# Patient Record
Sex: MALE | Race: OTHER | HISPANIC OR LATINO | URBAN - METROPOLITAN AREA
[De-identification: names, ages, dates, MRNs, and addresses within clinical notes are randomized per-mention and may not be internally consistent; named-entity substitution may affect disease eponyms.]

---

## 2017-09-16 ENCOUNTER — INPATIENT (INPATIENT)
Facility: HOSPITAL | Age: 27
LOS: 1 days | Discharge: HOME | DRG: 87 | End: 2017-09-18
Attending: SURGERY | Admitting: SURGERY
Payer: SELF-PAY

## 2017-09-16 VITALS
RESPIRATION RATE: 20 BRPM | HEIGHT: 60 IN | SYSTOLIC BLOOD PRESSURE: 129 MMHG | HEART RATE: 101 BPM | WEIGHT: 169.98 LBS | TEMPERATURE: 99 F | OXYGEN SATURATION: 95 % | DIASTOLIC BLOOD PRESSURE: 71 MMHG
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
## 2017-09-16 DIAGNOSIS — S02.91XA UNSPECIFIED FRACTURE OF SKULL, INITIAL ENCOUNTER FOR CLOSED FRACTURE: ICD-10-CM

## 2017-09-16 DIAGNOSIS — I60.9 NONTRAUMATIC SUBARACHNOID HEMORRHAGE, UNSPECIFIED: ICD-10-CM

## 2017-09-16 DIAGNOSIS — I62.00 NONTRAUMATIC SUBDURAL HEMORRHAGE, UNSPECIFIED: ICD-10-CM

## 2017-09-16 DIAGNOSIS — S06.6X1A TRAUMATIC SUBARACHNOID HEMORRHAGE WITH LOSS OF CONSCIOUSNESS OF 30 MINUTES OR LESS, INITIAL ENCOUNTER: ICD-10-CM

## 2017-09-16 LAB
ABO RH CONFIRMATION: SIGNIFICANT CHANGE UP
ALBUMIN SERPL ELPH-MCNC: 5 G/DL — SIGNIFICANT CHANGE UP (ref 3.3–5.2)
ALP SERPL-CCNC: 99 U/L — SIGNIFICANT CHANGE UP (ref 40–120)
ALT FLD-CCNC: 47 U/L — HIGH
ANION GAP SERPL CALC-SCNC: 16 MMOL/L — SIGNIFICANT CHANGE UP (ref 5–17)
APTT BLD: 28 SEC — SIGNIFICANT CHANGE UP (ref 27.5–37.4)
AST SERPL-CCNC: 39 U/L — SIGNIFICANT CHANGE UP
BILIRUB SERPL-MCNC: 0.2 MG/DL — LOW (ref 0.4–2)
BLD GP AB SCN SERPL QL: SIGNIFICANT CHANGE UP
BUN SERPL-MCNC: 12 MG/DL — SIGNIFICANT CHANGE UP (ref 8–20)
CALCIUM SERPL-MCNC: 8.7 MG/DL — SIGNIFICANT CHANGE UP (ref 8.6–10.2)
CHLORIDE SERPL-SCNC: 102 MMOL/L — SIGNIFICANT CHANGE UP (ref 98–107)
CO2 SERPL-SCNC: 21 MMOL/L — LOW (ref 22–29)
CREAT SERPL-MCNC: 0.65 MG/DL — SIGNIFICANT CHANGE UP (ref 0.5–1.3)
ETHANOL SERPL-MCNC: 230 MG/DL — SIGNIFICANT CHANGE UP
GLUCOSE SERPL-MCNC: 108 MG/DL — SIGNIFICANT CHANGE UP (ref 70–115)
HCT VFR BLD CALC: 45.2 % — SIGNIFICANT CHANGE UP (ref 42–52)
HGB BLD-MCNC: 15.2 G/DL — SIGNIFICANT CHANGE UP (ref 14–18)
INR BLD: 1 RATIO — SIGNIFICANT CHANGE UP (ref 0.88–1.16)
MCHC RBC-ENTMCNC: 31.2 PG — HIGH (ref 27–31)
MCHC RBC-ENTMCNC: 33.6 G/DL — SIGNIFICANT CHANGE UP (ref 32–36)
MCV RBC AUTO: 92.8 FL — SIGNIFICANT CHANGE UP (ref 80–94)
PLATELET # BLD AUTO: 353 K/UL — SIGNIFICANT CHANGE UP (ref 150–400)
POTASSIUM SERPL-MCNC: 3.7 MMOL/L — SIGNIFICANT CHANGE UP (ref 3.5–5.3)
POTASSIUM SERPL-SCNC: 3.7 MMOL/L — SIGNIFICANT CHANGE UP (ref 3.5–5.3)
PROT SERPL-MCNC: 7.8 G/DL — SIGNIFICANT CHANGE UP (ref 6.6–8.7)
PROTHROM AB SERPL-ACNC: 11 SEC — SIGNIFICANT CHANGE UP (ref 9.8–12.7)
RBC # BLD: 4.87 M/UL — SIGNIFICANT CHANGE UP (ref 4.6–6.2)
RBC # FLD: 12.3 % — SIGNIFICANT CHANGE UP (ref 11–15.6)
SODIUM SERPL-SCNC: 139 MMOL/L — SIGNIFICANT CHANGE UP (ref 135–145)
TYPE + AB SCN PNL BLD: SIGNIFICANT CHANGE UP
WBC # BLD: 13.6 K/UL — HIGH (ref 4.8–10.8)
WBC # FLD AUTO: 13.6 K/UL — HIGH (ref 4.8–10.8)

## 2017-09-16 PROCEDURE — 72125 CT NECK SPINE W/O DYE: CPT | Mod: 26

## 2017-09-16 PROCEDURE — 71010: CPT | Mod: 26

## 2017-09-16 PROCEDURE — 99222 1ST HOSP IP/OBS MODERATE 55: CPT

## 2017-09-16 PROCEDURE — 70450 CT HEAD/BRAIN W/O DYE: CPT | Mod: 26

## 2017-09-16 PROCEDURE — 70450 CT HEAD/BRAIN W/O DYE: CPT | Mod: 26,77

## 2017-09-16 PROCEDURE — 99291 CRITICAL CARE FIRST HOUR: CPT

## 2017-09-16 PROCEDURE — 99053 MED SERV 10PM-8AM 24 HR FAC: CPT

## 2017-09-16 RX ORDER — OXYCODONE AND ACETAMINOPHEN 5; 325 MG/1; MG/1
2 TABLET ORAL EVERY 6 HOURS
Qty: 0 | Refills: 0 | Status: DISCONTINUED | OUTPATIENT
Start: 2017-09-16 | End: 2017-09-18

## 2017-09-16 RX ORDER — LEVETIRACETAM 250 MG/1
500 TABLET, FILM COATED ORAL EVERY 12 HOURS
Qty: 0 | Refills: 0 | Status: DISCONTINUED | OUTPATIENT
Start: 2017-09-16 | End: 2017-09-18

## 2017-09-16 RX ORDER — ONDANSETRON 8 MG/1
4 TABLET, FILM COATED ORAL ONCE
Qty: 0 | Refills: 0 | Status: COMPLETED | OUTPATIENT
Start: 2017-09-16 | End: 2017-09-16

## 2017-09-16 RX ORDER — SODIUM CHLORIDE 9 MG/ML
1000 INJECTION INTRAMUSCULAR; INTRAVENOUS; SUBCUTANEOUS
Qty: 0 | Refills: 0 | Status: DISCONTINUED | OUTPATIENT
Start: 2017-09-16 | End: 2017-09-17

## 2017-09-16 RX ORDER — ACETAMINOPHEN 500 MG
650 TABLET ORAL EVERY 6 HOURS
Qty: 0 | Refills: 0 | Status: DISCONTINUED | OUTPATIENT
Start: 2017-09-16 | End: 2017-09-18

## 2017-09-16 RX ORDER — SODIUM CHLORIDE 9 MG/ML
1000 INJECTION, SOLUTION INTRAVENOUS
Qty: 0 | Refills: 0 | Status: DISCONTINUED | OUTPATIENT
Start: 2017-09-16 | End: 2017-09-18

## 2017-09-16 RX ORDER — OXYCODONE AND ACETAMINOPHEN 5; 325 MG/1; MG/1
1 TABLET ORAL EVERY 6 HOURS
Qty: 0 | Refills: 0 | Status: DISCONTINUED | OUTPATIENT
Start: 2017-09-16 | End: 2017-09-18

## 2017-09-16 RX ADMIN — SODIUM CHLORIDE 100 MILLILITER(S): 9 INJECTION, SOLUTION INTRAVENOUS at 12:49

## 2017-09-16 RX ADMIN — OXYCODONE AND ACETAMINOPHEN 1 TABLET(S): 5; 325 TABLET ORAL at 18:05

## 2017-09-16 RX ADMIN — OXYCODONE AND ACETAMINOPHEN 1 TABLET(S): 5; 325 TABLET ORAL at 18:20

## 2017-09-16 RX ADMIN — ONDANSETRON 4 MILLIGRAM(S): 8 TABLET, FILM COATED ORAL at 07:05

## 2017-09-16 RX ADMIN — LEVETIRACETAM 420 MILLIGRAM(S): 250 TABLET, FILM COATED ORAL at 08:29

## 2017-09-16 RX ADMIN — SODIUM CHLORIDE 100 MILLILITER(S): 9 INJECTION, SOLUTION INTRAVENOUS at 18:06

## 2017-09-16 RX ADMIN — SODIUM CHLORIDE 100 MILLILITER(S): 9 INJECTION INTRAMUSCULAR; INTRAVENOUS; SUBCUTANEOUS at 08:29

## 2017-09-16 RX ADMIN — LEVETIRACETAM 420 MILLIGRAM(S): 250 TABLET, FILM COATED ORAL at 18:19
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
## 2017-09-16 NOTE — ED PROVIDER NOTE - CARE PLAN
Principal Discharge DX:	Concussion, without LOC, initial encounter  Secondary Diagnosis:	Alcohol abuse Principal Discharge DX:	Subarachnoid hemorrhage following injury, with LOC of 30 min or less, initial encounter  Secondary Diagnosis:	Alcohol abuse
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
## 2017-09-16 NOTE — ED ADULT TRIAGE NOTE - CHIEF COMPLAINT QUOTE
patient arrived via ambulance found sleeping on concrete parking lot in [address], laying on a bagel  - aroused by ems with painful stimuli - old blood noted in patients mouth -  cell phone wallet, pants, belt, shoes, shirt, vest - 40.00  cash, keys - small yellow bag with white powder in it found in pants. pupils dilated - patient clothing removed and placed in yellow gown patient arrived via ambulance found sleeping on concrete parking lot in [address], laying on a bagel  - aroused by ems with painful stimuli - old blood noted in patients mouth -  cell phone wallet, pants, belt, shoes, shirt, vest - 48.00  cash, keys - small yellow bag with white powder in it found in pants. pupils dilated - patient clothing removed and placed in yellow gown. patient with  complains of pain to mouth and jaw area - no other injuries noted or other complaints. patient is unable to state weight and height. awake to self and age

## 2017-09-16 NOTE — ED PROVIDER NOTE - PHYSICAL EXAMINATION
VS: reviewed in triage note...  HEENT: swelling to posterior occiput   CV:s1,s2 no m/r/g  Lungs: cta b/l, no r/r/w  Abd: soft, nt/nd, +bs  EXT: no c/c/e  Neuro:no focal defecits  skin: no rash  Pulses: dpp, pt 2+ b/l

## 2017-09-16 NOTE — ED PROVIDER NOTE - PRINCIPAL DIAGNOSIS
Concussion, without LOC, initial encounter Subarachnoid hemorrhage following injury, with LOC of 30 min or less, initial encounter
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
## 2017-09-16 NOTE — H&P ADULT - PROBLEM SELECTOR PLAN 2
Admit to monitored bed in step down  Neuro checks Q2  Neurosurgery consultation   repeat head CT scan

## 2017-09-16 NOTE — H&P ADULT - NSHPPHYSICALEXAM_GEN_ALL_CORE
Gen: A&Ox3, NAD, Somnolent  HEENT: minimal swelling to the Left temporal region, contusion to the right occipital region, Pupils sluggish reactive 6mm pupils b/l, No cervical neck tenderness  Cardio:RRR  Pulm: clear equal b/l lungs  Abd: soft, ND , NT  Pelvis: stable , Nontender  Back: Nontender, no step off   Extremities: 5/5 motor in all extremities, distal pulses intact, ROM intact, Sensation intact in all extremities
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
## 2017-09-16 NOTE — ED ADULT NURSE REASSESSMENT NOTE - NS ED NURSE REASSESS COMMENT FT1
Assumed patient care from MICHELLE Payan at 1930, charting as noted. Received patient a&ox3, able to make needs known in [place], on a yellow gown with a one to one aide for constant observation at bedside. Patient vss, nsr on cm. Complains of pain at back of head, 2/10, doesn't want pain medication at this time. Right ac iv site without redness or swelling, patent as LR infusing at 100 cc/hr. Plan of care and wait time explained, patient verbalized understanding. Patient not in respiratory distress. To continue to monitor.
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
## 2017-09-16 NOTE — ED ADULT NURSE REASSESSMENT NOTE - NS ED NURSE REASSESS COMMENT FT1
Patient A&0X3. c/o mild headache, trauma team aware. VSS. CM in place. NSR. 1;1 at bedside. safety maintained.
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
## 2017-09-17 LAB
ANION GAP SERPL CALC-SCNC: 11 MMOL/L — SIGNIFICANT CHANGE UP (ref 5–17)
BASOPHILS # BLD AUTO: 0 K/UL — SIGNIFICANT CHANGE UP (ref 0–0.2)
BASOPHILS NFR BLD AUTO: 0.3 % — SIGNIFICANT CHANGE UP (ref 0–2)
BUN SERPL-MCNC: 10 MG/DL — SIGNIFICANT CHANGE UP (ref 8–20)
CALCIUM SERPL-MCNC: 9.1 MG/DL — SIGNIFICANT CHANGE UP (ref 8.6–10.2)
CHLORIDE SERPL-SCNC: 100 MMOL/L — SIGNIFICANT CHANGE UP (ref 98–107)
CO2 SERPL-SCNC: 28 MMOL/L — SIGNIFICANT CHANGE UP (ref 22–29)
CREAT SERPL-MCNC: 0.66 MG/DL — SIGNIFICANT CHANGE UP (ref 0.5–1.3)
EOSINOPHIL # BLD AUTO: 0.3 K/UL — SIGNIFICANT CHANGE UP (ref 0–0.5)
EOSINOPHIL NFR BLD AUTO: 2.7 % — SIGNIFICANT CHANGE UP (ref 0–5)
GLUCOSE SERPL-MCNC: 118 MG/DL — HIGH (ref 70–115)
HCT VFR BLD CALC: 42.3 % — SIGNIFICANT CHANGE UP (ref 42–52)
HGB BLD-MCNC: 14.4 G/DL — SIGNIFICANT CHANGE UP (ref 14–18)
LYMPHOCYTES # BLD AUTO: 17.6 % — LOW (ref 20–55)
LYMPHOCYTES # BLD AUTO: 2 K/UL — SIGNIFICANT CHANGE UP (ref 1–4.8)
MAGNESIUM SERPL-MCNC: 1.9 MG/DL — SIGNIFICANT CHANGE UP (ref 1.6–2.6)
MCHC RBC-ENTMCNC: 31.5 PG — HIGH (ref 27–31)
MCHC RBC-ENTMCNC: 34 G/DL — SIGNIFICANT CHANGE UP (ref 32–36)
MCV RBC AUTO: 92.6 FL — SIGNIFICANT CHANGE UP (ref 80–94)
MONOCYTES # BLD AUTO: 0.8 K/UL — SIGNIFICANT CHANGE UP (ref 0–0.8)
MONOCYTES NFR BLD AUTO: 7.6 % — SIGNIFICANT CHANGE UP (ref 3–10)
NEUTROPHILS # BLD AUTO: 7.9 K/UL — SIGNIFICANT CHANGE UP (ref 1.8–8)
NEUTROPHILS NFR BLD AUTO: 71.4 % — SIGNIFICANT CHANGE UP (ref 37–73)
PHOSPHATE SERPL-MCNC: 3.7 MG/DL — SIGNIFICANT CHANGE UP (ref 2.4–4.7)
PLATELET # BLD AUTO: 246 K/UL — SIGNIFICANT CHANGE UP (ref 150–400)
POTASSIUM SERPL-MCNC: 3.8 MMOL/L — SIGNIFICANT CHANGE UP (ref 3.5–5.3)
POTASSIUM SERPL-SCNC: 3.8 MMOL/L — SIGNIFICANT CHANGE UP (ref 3.5–5.3)
RBC # BLD: 4.57 M/UL — LOW (ref 4.6–6.2)
RBC # FLD: 11.8 % — SIGNIFICANT CHANGE UP (ref 11–15.6)
SODIUM SERPL-SCNC: 139 MMOL/L — SIGNIFICANT CHANGE UP (ref 135–145)
WBC # BLD: 11.1 K/UL — HIGH (ref 4.8–10.8)
WBC # FLD AUTO: 11.1 K/UL — HIGH (ref 4.8–10.8)

## 2017-09-17 RX ADMIN — SODIUM CHLORIDE 100 MILLILITER(S): 9 INJECTION, SOLUTION INTRAVENOUS at 08:36

## 2017-09-17 RX ADMIN — OXYCODONE AND ACETAMINOPHEN 1 TABLET(S): 5; 325 TABLET ORAL at 10:36

## 2017-09-17 RX ADMIN — OXYCODONE AND ACETAMINOPHEN 2 TABLET(S): 5; 325 TABLET ORAL at 20:46

## 2017-09-17 RX ADMIN — OXYCODONE AND ACETAMINOPHEN 2 TABLET(S): 5; 325 TABLET ORAL at 18:37

## 2017-09-17 RX ADMIN — SODIUM CHLORIDE 100 MILLILITER(S): 9 INJECTION, SOLUTION INTRAVENOUS at 18:38

## 2017-09-17 RX ADMIN — LEVETIRACETAM 420 MILLIGRAM(S): 250 TABLET, FILM COATED ORAL at 18:38

## 2017-09-17 RX ADMIN — LEVETIRACETAM 420 MILLIGRAM(S): 250 TABLET, FILM COATED ORAL at 08:36

## 2017-09-17 RX ADMIN — OXYCODONE AND ACETAMINOPHEN 1 TABLET(S): 5; 325 TABLET ORAL at 18:38

## 2017-09-17 NOTE — ED ADULT NURSE REASSESSMENT NOTE - GLASGOW COMA SCALE: BEST VERBAL RESPONSE
(V5) oriented

## 2017-09-17 NOTE — ED ADULT NURSE REASSESSMENT NOTE - CONDITION
improved
improved
unchanged

## 2017-09-17 NOTE — ED ADULT NURSE REASSESSMENT NOTE - VISUAL DISTURBANCES
0=not present

## 2017-09-17 NOTE — ED ADULT NURSE REASSESSMENT NOTE - NURSING NEURO LEVEL OF CONSCIOUSNESS
alert and awake
follows commands/alert and awake
alert and awake
follows commands/alert and awake
alert and awake
alert and awake
alert and awake/follows commands
alert and awake/follows commands

## 2017-09-17 NOTE — ED ADULT NURSE REASSESSMENT NOTE - PAROXYSMAL SWEATS
0=no sweat visible

## 2017-09-17 NOTE — ED ADULT NURSE REASSESSMENT NOTE - NS ED NURSE REASSESS COMMENT FT1
Assumed pt. care from Schuyler MARTINEZ. Pt. is a/o x3 connected to the cardiac monitor. Pt. denies any pain at this time and has relief of HA after pain administration. Pt. is able to move all extremities with ease and able to ambulate with a steady gait. Pt. respirations are even and unlabored. Will continue to monitor pt.

## 2017-09-17 NOTE — ED ADULT NURSE REASSESSMENT NOTE - GENERAL PATIENT STATE
comfortable appearance/cooperative
resting/sleeping
resting/sleeping
comfortable appearance/cooperative
comfortable appearance/cooperative
cooperative/comfortable appearance
cooperative/comfortable appearance
resting/sleeping/comfortable appearance
smiling/interactive/comfortable appearance/cooperative
comfortable appearance/cooperative
cooperative/comfortable appearance
comfortable appearance/cooperative
resting/sleeping/cooperative

## 2017-09-17 NOTE — ED ADULT NURSE REASSESSMENT NOTE - PAIN: RESPONSE TO INTERVENTIONS
absence of nonverbal indicators of pain
relief
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
## 2017-09-17 NOTE — ED ADULT NURSE REASSESSMENT NOTE - NEURO MENTATION
normal

## 2017-09-17 NOTE — ED ADULT NURSE REASSESSMENT NOTE - NS ED NURSE REASSESS COMMENT FT1
Patient A&OX3. c/o headache at this time. pain meds given as per MD ordered. VSS. CM in place. NSR. safety maintained.

## 2017-09-17 NOTE — ED ADULT NURSE REASSESSMENT NOTE - ANXIETY
1=mildly anxious
0=no anxiety, at ease
0=no anxiety, at ease
1=mildly anxious
0=no anxiety, at ease
1=mildly anxious
0=no anxiety, at ease
0=no anxiety, at ease
1=mildly anxious

## 2017-09-17 NOTE — ED ADULT NURSE REASSESSMENT NOTE - NS ED NURSE REASSESS COMMENT FT1
Pt. resting in bed with family at bedside. Pt. eating dinner. Pt. c/o of increase of pain at this time; informed pt. that pain medication is not due until later.

## 2017-09-17 NOTE — ED ADULT NURSE REASSESSMENT NOTE - TREMOR
0=no tremor
1=not visible but can be felt fingertip to fingertip
0=no tremor

## 2017-09-17 NOTE — ED ADULT NURSE REASSESSMENT NOTE - NAUSEA/VOMITING
0=no nausea with no vomiting

## 2017-09-17 NOTE — ED ADULT NURSE REASSESSMENT NOTE - AGITATION
0=normal activity

## 2017-09-17 NOTE — ED ADULT NURSE REASSESSMENT NOTE - FACIAL SYMMETRY
symmetrical

## 2017-09-17 NOTE — ED ADULT NURSE REASSESSMENT NOTE - GLASGOW COMA SCALE: BEST MOTOR RESPONSE
(M6) obeys commands
(M5) localizes pain
(M6) obeys commands

## 2017-09-17 NOTE — ED ADULT NURSE REASSESSMENT NOTE - NEURO SENSATION
sensory intact

## 2017-09-17 NOTE — ED ADULT NURSE REASSESSMENT NOTE - NS ED NURSE REASSESS COMMENT FT1
Patient received at 0730; awake; alert and oriented x4. Denies any pain or discomfort at this time. Denies SOB, dizziness. No distress noted. VSS. Respirations unlabored. Report received at bedside. Cardiac monitor in place. NSR. Call bell and personal items in reach. Continue to monitor patient and maintain safety.

## 2017-09-17 NOTE — ED ADULT NURSE REASSESSMENT NOTE - GLASGOW COMA SCALE: EYE OPENING
(E4) spontaneous
(E3) to speech
(E4) spontaneous

## 2017-09-17 NOTE — ED ADULT NURSE REASSESSMENT NOTE - GLASGOW COMA SCALE
repeat
baseline
repeat
repeat
baseline
repeat
baseline
repeat

## 2017-09-17 NOTE — PROGRESS NOTE ADULT - SUBJECTIVE AND OBJECTIVE BOX
INTERVAL HPI/OVERNIGHT EVENTS:    STATUS POST:      POST OPERATIVE DAY #:     SUBJECTIVE:  PT seen and examined in the am. Pt doing well this am. Pain has improved. CN II-XII intact. Denies N/V/F/C/CP/SOB    MEDICATIONS  (STANDING):  levETIRAcetam  IVPB 500 milliGRAM(s) IV Intermittent every 12 hours  sodium chloride 0.9%. 1000 milliLiter(s) (100 mL/Hr) IV Continuous <Continuous>  lactated ringers. 1000 milliLiter(s) (100 mL/Hr) IV Continuous <Continuous>    MEDICATIONS  (PRN):  acetaminophen   Tablet. 650 milliGRAM(s) Oral every 6 hours PRN Mild Pain (1 - 3)  oxyCODONE    5 mG/acetaminophen 325 mG 2 Tablet(s) Oral every 6 hours PRN Severe Pain (7 - 10)  oxyCODONE    5 mG/acetaminophen 325 mG 1 Tablet(s) Oral every 6 hours PRN Moderate Pain (4 - 6)      Vital Signs Last 24 Hrs  T(C): 36.8 (17 Sep 2017 07:34), Max: 37.3 (16 Sep 2017 16:06)  T(F): 98.3 (17 Sep 2017 07:34), Max: 99.1 (16 Sep 2017 16:06)  HR: 53 (17 Sep 2017 09:03) (53 - 92)  BP: 137/65 (17 Sep 2017 09:03) (119/59 - 160/74)  BP(mean): --  RR: 23 (17 Sep 2017 09:03) (16 - 23)  SpO2: 95% (17 Sep 2017 09:03) (94% - 100%)    PHYSICAL EXAM  	Gen A&Ox3, NAD  Neuro: GCS 15, CN II-XII intact  HEENT: minimal swelling to the Left temporal region, contusion to the right occipital region, pupils reactive b/l 4mm  No cervical neck tenderness  	Cardio:RRR  	Pulm: clear equal b/l lungs  	Abd: soft, ND , NT  	Pelvis: stable , Nontender  	Back: Nontender, no step off   Extremities: 5/5 motor in all extremities, distal pulses intact, ROM intact, Sensation intact in all extremities  	      I&O's Detail    16 Sep 2017 07:01  -  17 Sep 2017 07:00  --------------------------------------------------------  IN:  Total IN: 0 mL    OUT:    Voided: 2850 mL  Total OUT: 2850 mL    Total NET: -2850 mL      17 Sep 2017 07:01  -  17 Sep 2017 09:24  --------------------------------------------------------  IN:  Total IN: 0 mL    OUT:    Voided: 1000 mL  Total OUT: 1000 mL    Total NET: -1000 mL          LABS:                        15.2   13.6  )-----------( 353      ( 16 Sep 2017 08:11 )             45.2     09-16    139  |  102  |  12.0  ----------------------------<  108  3.7   |  21.0<L>  |  0.65    Ca    8.7      16 Sep 2017 08:11    TPro  7.8  /  Alb  5.0  /  TBili  0.2<L>  /  DBili  x   /  AST  39  /  ALT  47<H>  /  AlkPhos  99  09-16    PT/INR - ( 16 Sep 2017 08:11 )   PT: 11.0 sec;   INR: 1.00 ratio         PTT - ( 16 Sep 2017 08:11 )  PTT:28.0 sec      RADIOLOGY & ADDITIONAL STUDIES:

## 2017-09-17 NOTE — ED ADULT NURSE REASSESSMENT NOTE - NURSING NEURO ORIENTATION
oriented to person, place and time

## 2017-09-17 NOTE — PHYSICAL THERAPY INITIAL EVALUATION ADULT - ADDITIONAL COMMENTS
Pt. lives in a house with his mother with no stairs to enter and no stairs inside. Does not own DME.

## 2017-09-17 NOTE — ED ADULT NURSE REASSESSMENT NOTE - ORIENTATION
0=oriented and can do serial additions

## 2017-09-17 NOTE — ED ADULT NURSE REASSESSMENT NOTE - COMFORT CARE
wait time explained/plan of care explained
darkened lights/repositioned
plan of care explained
plan of care explained/wait time explained
meal provided/repositioned/side rails up/plan of care explained/po fluids offered
side rails up/repositioned/plan of care explained
plan of care explained/wait time explained
plan of care explained/wait time explained
wait time explained/plan of care explained

## 2017-09-18 VITALS
TEMPERATURE: 99 F | SYSTOLIC BLOOD PRESSURE: 132 MMHG | OXYGEN SATURATION: 99 % | HEART RATE: 64 BPM | RESPIRATION RATE: 18 BRPM | DIASTOLIC BLOOD PRESSURE: 80 MMHG

## 2017-09-18 PROCEDURE — 85027 COMPLETE CBC AUTOMATED: CPT

## 2017-09-18 PROCEDURE — 85730 THROMBOPLASTIN TIME PARTIAL: CPT

## 2017-09-18 PROCEDURE — 83735 ASSAY OF MAGNESIUM: CPT

## 2017-09-18 PROCEDURE — 85610 PROTHROMBIN TIME: CPT

## 2017-09-18 PROCEDURE — 36415 COLL VENOUS BLD VENIPUNCTURE: CPT

## 2017-09-18 PROCEDURE — 84100 ASSAY OF PHOSPHORUS: CPT

## 2017-09-18 PROCEDURE — 96374 THER/PROPH/DIAG INJ IV PUSH: CPT

## 2017-09-18 PROCEDURE — 86901 BLOOD TYPING SEROLOGIC RH(D): CPT

## 2017-09-18 PROCEDURE — 70450 CT HEAD/BRAIN W/O DYE: CPT

## 2017-09-18 PROCEDURE — 72125 CT NECK SPINE W/O DYE: CPT

## 2017-09-18 PROCEDURE — 97163 PT EVAL HIGH COMPLEX 45 MIN: CPT

## 2017-09-18 PROCEDURE — 80307 DRUG TEST PRSMV CHEM ANLYZR: CPT

## 2017-09-18 PROCEDURE — 80048 BASIC METABOLIC PNL TOTAL CA: CPT

## 2017-09-18 PROCEDURE — 86900 BLOOD TYPING SEROLOGIC ABO: CPT

## 2017-09-18 PROCEDURE — T1013: CPT

## 2017-09-18 PROCEDURE — 80053 COMPREHEN METABOLIC PANEL: CPT

## 2017-09-18 PROCEDURE — 99285 EMERGENCY DEPT VISIT HI MDM: CPT | Mod: 25

## 2017-09-18 PROCEDURE — 86850 RBC ANTIBODY SCREEN: CPT

## 2017-09-18 PROCEDURE — 71045 X-RAY EXAM CHEST 1 VIEW: CPT

## 2017-09-18 RX ORDER — OXYCODONE HYDROCHLORIDE 5 MG/1
1 TABLET ORAL
Qty: 18 | Refills: 0 | OUTPATIENT
Start: 2017-09-18 | End: 2017-09-21

## 2017-09-18 RX ADMIN — OXYCODONE AND ACETAMINOPHEN 2 TABLET(S): 5; 325 TABLET ORAL at 11:26

## 2017-09-18 RX ADMIN — SODIUM CHLORIDE 100 MILLILITER(S): 9 INJECTION, SOLUTION INTRAVENOUS at 11:25

## 2017-09-18 RX ADMIN — OXYCODONE AND ACETAMINOPHEN 2 TABLET(S): 5; 325 TABLET ORAL at 00:43

## 2017-09-18 RX ADMIN — OXYCODONE AND ACETAMINOPHEN 2 TABLET(S): 5; 325 TABLET ORAL at 12:10

## 2017-09-18 RX ADMIN — SODIUM CHLORIDE 100 MILLILITER(S): 9 INJECTION, SOLUTION INTRAVENOUS at 05:05

## 2017-09-18 RX ADMIN — LEVETIRACETAM 420 MILLIGRAM(S): 250 TABLET, FILM COATED ORAL at 05:05

## 2017-09-18 RX ADMIN — OXYCODONE AND ACETAMINOPHEN 2 TABLET(S): 5; 325 TABLET ORAL at 01:40

## 2017-09-18 RX ADMIN — SODIUM CHLORIDE 100 MILLILITER(S): 9 INJECTION, SOLUTION INTRAVENOUS at 00:42

## 2017-09-18 NOTE — DISCHARGE NOTE ADULT - CARE PROVIDER_API CALL
Ned Vallejo (DO), Neurological Surgery  [address] [place] [address]  Phone: [phone]  Fax: [phone] Ned Vallejo (DO), Neurological Surgery  [address] [place] [address]  Phone: [phone]  Fax: [phone]    [facility],   Phone: [phone]  Fax: (   )    - Ned Vallejo (DO), Neurological Surgery  [address] [place] [address]  Phone: [phone]  Fax: [phone]    [facility],   Phone: [phone]  Fax: (   )    -    [facility],   [facility] [place] [address]  Phone: [phone]  Fax: (   )    -

## 2017-09-18 NOTE — DISCHARGE NOTE ADULT - PROVIDER TOKENS
TOKEN:'[id number]' TOKEN:'[id number]',FREE:[LAST:[[facility]],PHONE:[phone]],FAX:[(   )    -]] TOKEN:'[id number]',FREE:[LAST:[[facility]],PHONE:[phone]],FAX:[(   )    -]],FREE:[LAST:[[facility]],PHONE:[phone]],FAX:[(   )    -],ADDRESS:[[address] [place] [address]]]

## 2017-09-18 NOTE — DISCHARGE NOTE ADULT - PATIENT PORTAL LINK FT
“You can access the FollowHealth Patient Portal, offered by [facility], by registering with the following website: [email or web address]”

## 2017-09-18 NOTE — DISCHARGE NOTE ADULT - PLAN OF CARE
Alleviation of pain and symptoms Follow up: Please call and make an appointment with DR. KEATING 2 weeks after discharge. Also, please call and make an appointment with your primary care physician as per your usual schedule.   Activity: May return to normal activities as tolerated.  Diet: May continue regular diet.  Medications: Please take all home medications as prescribed by your primary care doctor. Pain medication has been prescribed for you (oxycodone). Please, take it as it has been prescribed, do not drive or operate heavy machinery while taking narcotics.  You are STRONGLY encouraged to take over-the-counter tylenol for pain relief when you feel your pain no longer warrants the use of narcotic pain medications.  Patient is advised to RETURN TO THE EMERGENCY DEPARTMENT for any of the following - worsening pain, fever/chills, nausea/vomiting, altered mental status, chest pain, shortness of breath, or any other new / worsening symptom. See above Follow up: Please call and make an appointment with DR. KEATING 2 weeks after discharge. It is recommended that you call and make a follow-up appointment with the [facility] as well (contact information provided below). Also, please call and make an appointment with your primary care physician as per your usual schedule.   Activity: May return to normal activities as tolerated.  Diet: May continue regular diet.  Medications: Please take all home medications as prescribed by your primary care doctor. Pain medication has been prescribed for you (oxycodone). Please, take it as it has been prescribed, do not drive or operate heavy machinery while taking narcotics.  You are STRONGLY encouraged to take over-the-counter tylenol for pain relief when you feel your pain no longer warrants the use of narcotic pain medications.  Patient is advised to RETURN TO THE EMERGENCY DEPARTMENT for any of the following - worsening pain, fever/chills, nausea/vomiting, altered mental status, chest pain, shortness of breath, or any other new / worsening symptom. Follow up: Please call and make an appointment with DR. KEATING 2 weeks after discharge. It is recommended that you call and make a follow-up appointment with the [facility] as well (contact information provided below). Also, please call and make an appointment with your primary care physician as per your usual schedule. If you do not have a primary care provider you may follow-up at the [facility].  Activity: May return to normal activities as tolerated.  Diet: May continue regular diet.  Medications: Please take all home medications as prescribed by your primary care doctor. Pain medication has been prescribed for you (oxycodone). Please, take it as it has been prescribed, do not drive or operate heavy machinery while taking narcotics.  You are STRONGLY encouraged to take over-the-counter tylenol for pain relief when you feel your pain no longer warrants the use of narcotic pain medications.  Patient is advised to RETURN TO THE EMERGENCY DEPARTMENT for any of the following - worsening pain, fever/chills, nausea/vomiting, altered mental status, chest pain, shortness of breath, or any other new / worsening symptom. Please refer to the referral to the [facility] in [place] that the social workers provided you with.

## 2017-09-18 NOTE — DISCHARGE NOTE ADULT - CARE PROVIDERS DIRECT ADDRESSES
[email or web address],DirectAddress_Unknown [email or web address],DirectAddress_Unknown,DirectAddress_Unknown
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
## 2017-09-18 NOTE — DISCHARGE NOTE ADULT - CARE PLAN
Principal Discharge DX:	Subdural hematoma  Goal:	Alleviation of pain and symptoms  Instructions for follow-up, activity and diet:	Follow up: Please call and make an appointment with DR. KEATING 2 weeks after discharge. Also, please call and make an appointment with your primary care physician as per your usual schedule.   Activity: May return to normal activities as tolerated.  Diet: May continue regular diet.  Medications: Please take all home medications as prescribed by your primary care doctor. Pain medication has been prescribed for you (oxycodone). Please, take it as it has been prescribed, do not drive or operate heavy machinery while taking narcotics.  You are STRONGLY encouraged to take over-the-counter tylenol for pain relief when you feel your pain no longer warrants the use of narcotic pain medications.  Patient is advised to RETURN TO THE EMERGENCY DEPARTMENT for any of the following - worsening pain, fever/chills, nausea/vomiting, altered mental status, chest pain, shortness of breath, or any other new / worsening symptom.  Secondary Diagnosis:	Subarachnoid hemorrhage following injury, with LOC of 30 min or less, initial encounter  Instructions for follow-up, activity and diet:	See above Principal Discharge DX:	Subdural hematoma  Goal:	Alleviation of pain and symptoms  Instructions for follow-up, activity and diet:	Follow up: Please call and make an appointment with DR. KEATING 2 weeks after discharge. It is recommended that you call and make a follow-up appointment with the [facility] as well (contact information provided below). Also, please call and make an appointment with your primary care physician as per your usual schedule.   Activity: May return to normal activities as tolerated.  Diet: May continue regular diet.  Medications: Please take all home medications as prescribed by your primary care doctor. Pain medication has been prescribed for you (oxycodone). Please, take it as it has been prescribed, do not drive or operate heavy machinery while taking narcotics.  You are STRONGLY encouraged to take over-the-counter tylenol for pain relief when you feel your pain no longer warrants the use of narcotic pain medications.  Patient is advised to RETURN TO THE EMERGENCY DEPARTMENT for any of the following - worsening pain, fever/chills, nausea/vomiting, altered mental status, chest pain, shortness of breath, or any other new / worsening symptom.  Secondary Diagnosis:	Subarachnoid hemorrhage following injury, with LOC of 30 min or less, initial encounter  Instructions for follow-up, activity and diet:	See above Principal Discharge DX:	Subdural hematoma  Goal:	Alleviation of pain and symptoms  Instructions for follow-up, activity and diet:	Follow up: Please call and make an appointment with DR. KEATING 2 weeks after discharge. It is recommended that you call and make a follow-up appointment with the [facility] as well (contact information provided below). Also, please call and make an appointment with your primary care physician as per your usual schedule. If you do not have a primary care provider you may follow-up at the [facility].  Activity: May return to normal activities as tolerated.  Diet: May continue regular diet.  Medications: Please take all home medications as prescribed by your primary care doctor. Pain medication has been prescribed for you (oxycodone). Please, take it as it has been prescribed, do not drive or operate heavy machinery while taking narcotics.  You are STRONGLY encouraged to take over-the-counter tylenol for pain relief when you feel your pain no longer warrants the use of narcotic pain medications.  Patient is advised to RETURN TO THE EMERGENCY DEPARTMENT for any of the following - worsening pain, fever/chills, nausea/vomiting, altered mental status, chest pain, shortness of breath, or any other new / worsening symptom.  Secondary Diagnosis:	Subarachnoid hemorrhage following injury, with LOC of 30 min or less, initial encounter  Instructions for follow-up, activity and diet:	See above  Secondary Diagnosis:	Non-depressed fracture of skull, closed, initial encounter  Secondary Diagnosis:	Alcohol use  Instructions for follow-up, activity and diet:	Please refer to the referral to the [facility] in [place] that the social workers provided you with.

## 2017-09-18 NOTE — DISCHARGE NOTE ADULT - MEDICATION SUMMARY - MEDICATIONS TO TAKE
I will START or STAY ON the medications listed below when I get home from the hospital:    oxyCODONE 5 mg oral tablet  -- 1 tab(s) by mouth every 4-6 hours, As Needed -for severe pain MDD:6  -- Caution federal law prohibits the transfer of this drug to any person other  than the person for whom it was prescribed.  It is very important that you take or use this exactly as directed.  Do not skip doses or discontinue unless directed by your doctor.  May cause drowsiness.  Alcohol may intensify this effect.  Use care when operating dangerous machinery.  This prescription cannot be refilled.  Using more of this medication than prescribed may cause serious breathing problems.    -- Indication: For Severe pain - as needed

## 2018-03-03 ENCOUNTER — EMERGENCY (EMERGENCY)
Facility: HOSPITAL | Age: 28
LOS: 1 days | Discharge: HOME | End: 2018-03-03
Attending: EMERGENCY MEDICINE
Payer: SELF-PAY

## 2018-03-03 VITALS
HEIGHT: 66 IN | WEIGHT: 190.04 LBS | OXYGEN SATURATION: 98 % | HEART RATE: 102 BPM | RESPIRATION RATE: 18 BRPM | DIASTOLIC BLOOD PRESSURE: 82 MMHG | SYSTOLIC BLOOD PRESSURE: 143 MMHG | TEMPERATURE: 98 F

## 2018-03-03 PROCEDURE — 99053 MED SERV 10PM-8AM 24 HR FAC: CPT

## 2018-03-03 PROCEDURE — 12013 RPR F/E/E/N/L/M 2.6-5.0 CM: CPT

## 2018-03-03 PROCEDURE — 99284 EMERGENCY DEPT VISIT MOD MDM: CPT | Mod: 25

## 2018-03-03 NOTE — ED ADULT TRIAGE NOTE - CHIEF COMPLAINT QUOTE
s/p fall with laceration to back of head, Denies LOC, HA s/p fall with laceration to back of head, Denies LOC, HA, no blood thinners, bleeding controlled s/p fall with laceration to back of head hit corner molding, Denies LOC, HA, no blood thinners, bleeding controlled

## 2018-03-04 VITALS
SYSTOLIC BLOOD PRESSURE: 137 MMHG | OXYGEN SATURATION: 99 % | RESPIRATION RATE: 18 BRPM | HEART RATE: 82 BPM | DIASTOLIC BLOOD PRESSURE: 87 MMHG

## 2018-03-04 PROCEDURE — 90715 TDAP VACCINE 7 YRS/> IM: CPT

## 2018-03-04 PROCEDURE — 72125 CT NECK SPINE W/O DYE: CPT

## 2018-03-04 PROCEDURE — 99284 EMERGENCY DEPT VISIT MOD MDM: CPT | Mod: 25

## 2018-03-04 PROCEDURE — 90471 IMMUNIZATION ADMIN: CPT

## 2018-03-04 PROCEDURE — 70450 CT HEAD/BRAIN W/O DYE: CPT | Mod: 26

## 2018-03-04 PROCEDURE — 70450 CT HEAD/BRAIN W/O DYE: CPT

## 2018-03-04 PROCEDURE — 12013 RPR F/E/E/N/L/M 2.6-5.0 CM: CPT

## 2018-03-04 PROCEDURE — T1013: CPT

## 2018-03-04 PROCEDURE — 72125 CT NECK SPINE W/O DYE: CPT | Mod: 26

## 2018-03-04 RX ORDER — TETANUS TOXOID, REDUCED DIPHTHERIA TOXOID AND ACELLULAR PERTUSSIS VACCINE, ADSORBED 5; 2.5; 8; 8; 2.5 [IU]/.5ML; [IU]/.5ML; UG/.5ML; UG/.5ML; UG/.5ML
0.5 SUSPENSION INTRAMUSCULAR ONCE
Qty: 0 | Refills: 0 | Status: COMPLETED | OUTPATIENT
Start: 2018-03-04 | End: 2018-03-04

## 2018-03-04 RX ADMIN — TETANUS TOXOID, REDUCED DIPHTHERIA TOXOID AND ACELLULAR PERTUSSIS VACCINE, ADSORBED 0.5 MILLILITER(S): 5; 2.5; 8; 8; 2.5 SUSPENSION INTRAMUSCULAR at 01:03

## 2018-03-04 NOTE — ED ADULT NURSE NOTE - CHIEF COMPLAINT QUOTE
s/p fall with laceration to back of head hit corner molding, Denies LOC, HA, no blood thinners, bleeding controlled

## 2018-03-04 NOTE — ED PROVIDER NOTE - PHYSICAL EXAMINATION
General: Patient sleeping on stretcher upon entering room, Behavior consistent with alcohol intoxication, alcohol on breath noted, patient lethargic during examination Eyes: PERRLA, Conjunctiva pink, sclera white bilaterally ENT: Moist mucous membranes, no cracked teeth noted Cardio: Regular rate and rhythm, S1/S2, no murmurs Resp: Clear to auscultation bilaterally, no wheezes, rales or rhonchi Abd: Soft, nontender, nondistended MSK: FROM in all extremities Neuro: Intoxicated not cooperative during neurological exam Skin: Laceration 3.0 cm to occipital lobe, not actively bleeding

## 2018-03-04 NOTE — ED PROVIDER NOTE - PROGRESS NOTE DETAILS
Patient is able to ambulate without difficulties, is Alert and orientated, neurologically intact on neuro exam: alert and orientated, cn II-XII intact, finger to nose intact, neurovasculary intact, muscle strength fair, gait without ataxia, reviewed ct results, informed pt that staples will be removed in 5 days, pt verbalizes understanding results and d/c instructions

## 2018-03-04 NOTE — ED PROVIDER NOTE - ATTENDING CONTRIBUTION TO CARE
I personally saw the patient with the PA, and completed the key components of the history and physical exam. I then discussed the management plan with the PA.   gen in nad resp clear cardaic no murmur abd sfiot neuro no deficits   lac repiared return to ed for intractable HA, persistent vomiting, or new onset motor/sensory deficits
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
## 2018-03-04 NOTE — ED PROCEDURE NOTE - ATTENDING CONTRIBUTION TO CARE
I personally saw the patient with the PA, and completed the key components of the history and physical exam. I then discussed the management plan with the PA.   agree with pa procedure

## 2018-03-22 NOTE — ED PROCEDURE NOTE - NS ED ATTENDING STATEMENT MOD
4 I have personally performed a face to face diagnostic evaluation on this patient. I have reviewed the ACP note and agree with the history, exam and plan of care, except as noted.

## 2019-04-08 NOTE — PATIENT PROFILE ADULT. - NS TRANSFER DISPOSITION PATIENT BELONGINGS
states that she has been feeling "blah" over the past day. no fevers chills chest pain palpitations or shortness of breath. states that she has a cough which she attributes to seasonal allergies, and post nasal drip. states that yesterday she "passed out" unsure of LOC, states that she was laying in bed, no head injury. states that this has happened to her before and she has been seen and evaluated by cardiology in the past. does not endorse current headache dizziness lightheadedness numbness tingling or weakness. earlier today, states that she felt as if "like I wasn't there, like I was about to pass out" and symptoms subsided. states that otherwise she feels well. sates that she has been taking her medications as prescribed and eating and drinking well. no nausea vomiting diarrhea, blood in stool or melena. with patient

## 2022-10-24 ENCOUNTER — EMERGENCY (EMERGENCY)
Facility: HOSPITAL | Age: 32
LOS: 1 days | Discharge: HOME | End: 2022-10-24
Attending: STUDENT IN AN ORGANIZED HEALTH CARE EDUCATION/TRAINING PROGRAM
Payer: SELF-PAY

## 2022-10-24 VITALS
OXYGEN SATURATION: 97 % | DIASTOLIC BLOOD PRESSURE: 78 MMHG | SYSTOLIC BLOOD PRESSURE: 119 MMHG | HEIGHT: 66 IN | HEART RATE: 83 BPM | RESPIRATION RATE: 18 BRPM | TEMPERATURE: 99 F | WEIGHT: 169.98 LBS

## 2022-10-24 PROCEDURE — 99284 EMERGENCY DEPT VISIT MOD MDM: CPT

## 2022-10-24 RX ORDER — DEXAMETHASONE 0.5 MG/5ML
6 ELIXIR ORAL ONCE
Refills: 0 | Status: COMPLETED | OUTPATIENT
Start: 2022-10-24 | End: 2022-10-24

## 2022-10-24 RX ADMIN — Medication 6 MILLIGRAM(S): at 21:09

## 2022-10-24 RX ADMIN — Medication 1 APPLICATION(S): at 20:19

## 2022-10-24 RX ADMIN — Medication 50 MILLIGRAM(S): at 20:19

## 2022-10-24 NOTE — ED ADULT NURSE NOTE - INTERVENTIONS DEFINITIONS
Instruct patient to call for assistance/Non-slip footwear when patient is off stretcher/Physically safe environment: no spills, clutter or unnecessary equipment/Stretcher in lowest position, wheels locked, appropriate side rails in place/Provide visual cue, wrist band, yellow gown, etc./Monitor gait and stability/Monitor for mental status changes and reorient to person, place, and time/Reinforce activity limits and safety measures with patient and family

## 2022-10-24 NOTE — ED PROVIDER NOTE - CLINICAL SUMMARY MEDICAL DECISION MAKING FREE TEXT BOX
31 yo male BIBA for intoxication. Contact dermatitis r groin. Will prescribe Triamcinolone cream. Check FS glucose. Fall precautions. Monitor for sobriety.
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
## 2022-10-24 NOTE — ED PROVIDER NOTE - PATIENT PORTAL LINK FT
You can access the FollowMyHealth Patient Portal offered by [facility] by registering at the following website: [email or web address]. By joining [employer]’s FollowMyHealth portal, you will also be able to view your health information using other applications (apps) compatible with our system. You can access the FollowMyHealth Patient Portal offered by [facility] by registering at the following website: [email or web address]. By joining [employer]’s FollowMyHealth portal, you will also be able to view your health information using other applications (apps) compatible with our system.

## 2022-10-24 NOTE — ED PROVIDER NOTE - PHYSICAL EXAMINATION
Gen: Intoxicated. Alcohol smell on breath.   Head: NC/AT  Neck: trachea midline  Resp:  No distress, lungs cta b/l  Cardiac: Heart rrr. No murmur.  Abdomen: Soft, nontender, nondistended.   : No testicular ttp. Contact dermatitis noted r groin.   Ext: no deformities  Neuro: Nonfocal exam.   Skin:  Warm and dry as visualized. Psoriasis diffuse on abdomen.

## 2022-10-24 NOTE — ED ADULT NURSE NOTE - OBJECTIVE STATEMENT
Pt in yellow gown upon exam.  As per report pt admits to drinking alcohol.  Pt refusing to answer questions currently.  Does report pain to his groin. Pt has diffuse psoriasis rash over entire body.

## 2022-10-24 NOTE — ED PROVIDER NOTE - ATTENDING CONTRIBUTION TO CARE
BIBEMS for suspected alcohol intoxication. No signs of trauma. Hx limited at this time due to patient's state. Appears in no distress. Pt with diffuse areas of psoriasis like patches  General: no signs of trauma, somnolent, in no apparent distress.  Head: AT, NC  HEENT: Airway patent., no de la o sign, no raccoon eyes, no hemotympanum   Eyes: clear bilaterally, pupils equal, round and reactive to light.  Cardiac: Normal rate, regular rhythm.  Heart sounds S1, S2.    Respiratory: Breath sounds clear and equal bilaterally.  Abdomen soft, non-tender, no guarding.  MSK: moving all extremities x 4  Neuro: somnolent, follows commands, ataxia, slurred speech  Skin: normal color, psoriasis like patches  reassess for clinical sobriety

## 2022-10-24 NOTE — ED PROVIDER NOTE - NSFOLLOWUPINSTRUCTIONS_ED_ALL_ED_FT
Dermatitis de contacto    Contact Dermatitis      La dermatitis es el enrojecimiento, el dolor y la hinchazón (inflamación) de la piel. La dermatitis de contacto es alejandro reacción a ciertas sustancias que entran en contacto con la piel. Muchas sustancias diferentes pueden causar dermatitis de contacto. Hay dos tipos de dermatitis de contacto:  •Dermatitis de contacto irritativa. La causa de tigist tipo de dermatitis es algo que irrita la piel, jerica tener las shanell secas por lavarlas muy seguido con jabón. Tigist tipo no requiere la exposición previa a la sustancia que causó la reacción. Tigist es el tipo más frecuente.      •Dermatitis de contacto alérgica. La causa de tigist tipo de dermatitis es alejandro sustancia a la cual se es alérgico, jerica la hiedra venenosa. Tigist tipo se produce cuando se ha estado expuesto a la sustancia (alérgeno) y se produce sensibilidad a isabel. La dermatitis puede presentarse poco después de la primera exposición al alérgeno, o es posible que no aparezca hasta la próxima vez que se encuentre expuesto y cada vez a partir de entonces.        ¿Cuáles son las causas?    La causa más frecuente de la dermatitis de contacto irritativa es la exposición a lo siguiente:  •Maquillaje.      •Jabones.      •Detergentes.      •Lavandina.      •Ácidos.      •Sales metálicas, jerica el níquel.      La causa más frecuente de la dermatitis de contacto alérgica es la exposición a lo siguiente:  •Plantas venenosas.      •Productos químicos.      •Alhajas.      •Látex.      •Medicamentos.      •Conservantes que se utilizan en determinados productos, jerica la ropa.        ¿Qué incrementa el riesgo?    Es más probable que tenga esta afección si presenta:  •Un trabajo que lo expone a sustancias irritantes o a alérgenos.      •Determinadas afecciones médicas, por ejemplo, asma o eczema.        ¿Cuáles son los signos o los síntomas?     Los síntomas de esta afección pueden presentarse en cualquier parte del cuerpo con la que usted toque la sustancia irritante o donde la sustancia irritante lo haya tocado.•Algunos de los síntomas son los siguientes:  •Sequedad o descamación.      •Enrojecimiento.      •Grietas.      •Picazón.      •Dolor o sensación de ardor.      •Ampollas.      •Secreción de pequeñas cantidades de krystal o líquido transparente que emanan de las grietas de la piel.        En el caitlyn de la dermatitis de contacto alérgica, puede sharon hinchazón solo en algunas partes del cuerpo, jerica la boca o los genitales.      ¿Cómo se diagnostica?    Esta afección se diagnostica mediante alejandro revisión de los antecedentes médicos y un examen físico.  •Se puede realizar alejandro prueba del parche para ayudar a determinar la causa.      •Si la afección guarda relación con el trabajo, saul vez deba consultar a un especialista en medicina ocupacional.        ¿Cómo se trata?    Esta afección se trata mediante un control para detectar la causa de la reacción y proteger la piel de nuevos contactos. El tratamiento también puede incluir lo siguiente:  •Cremas o ungüentos con corticoesteroides. En los casos más graves será necesario aplicar medicamentos con corticoesteroides por vía oral.      •Medicamentos con antibióticos o ungüentos antibacterianos, si hay alejandro infección en la piel.      •Antihistamínicos en forma de loción o por vía oral para calmar la picazón.      •Alejandro venda (vendaje).        Siga estas indicaciones en mix casa:    Cuidado de la piel     •Huméctese la piel según sea necesario.      •Aplique compresas frías en las zonas afectadas.      •Intente colocarse alejandro pasta de bicarbonato de sodio sobre la piel. Agregue agua al bicarbonato hasta que tenga la consistencia de alejandro pasta.      • No se rasque la piel, y evite la fricción de la mariah afectada.      •Evite el uso de jabones, perfumes y tintes.      Medicamentos     •[place] o aplíquese los medicamentos de venta bird y los recetados solamente jerica se lo haya indicado el médico.      •Si le recetaron un antibiótico, tómelo o aplíqueselo jerica se lo haya indicado el médico. No deje de usar el antibiótico aunque la afección mejore.      Bañarse   •Trate de ashia un baño con lo siguiente:  •Sales de Eps[place]. Siga las indicaciones del envase. Puede conseguirlas en la niecy de comestibles o la farmacia local.      •Bicarbonato de sodio. Vierta un poco en la bañera jerica se lo haya indicado el médico.      •Christina coloidal. Siga las indicaciones del envase. Puede conseguirla en la niecy de comestibles o la farmacia local.        •Báñese con menos frecuencia, por ejemplo, día por medio.      •Báñese con agua templada. No use [place].      Cuidado de la venda     •Si le colocaron alejandro venda (vendaje), cámbiela jerica se lo haya indicado el médico.      •Lávese las shanell con agua y jabón antes y después de cambiar el vendaje. Use desinfectante para shanell si no dispone de agua y jabón.      Indicaciones generales   •Evite la sustancia que ha causado la erupción. Si no sabe qué la causó, lleve un diario para tratar de identificar la causa. Escriba los siguientes datos:  •Lo que come.      •Los cosméticos que utiliza.      •Lo que ralf.      •Lo que llevó puesto en la mariah afectada. Addis incluye las alhajas.      •Controle todos los días las zonas afectadas para detectar signos de infección. Esté atento a los siguientes signos:  •Aumento del enrojecimiento, la hinchazón o el dolor.      •Más líquido o krystal.      •Calor.      •Pus o mal olor.        •Concurra a todas las visitas de seguimiento jerica se lo haya indicado el médico. Addis es importante.        Comuníquese con un médico si:    •La afección no mejora con tratamiento.      •Mix afección empeora.      •Observa signos de infección, jerica hinchazón, sensibilidad, enrojecimiento, dolor o calor en la mariah afectada.      •Tiene fiebre.      •Aparecen nuevos síntomas.        Solicite ayuda inmediatamente si:    •Tiene dolor de kira intenso o dolor o rigidez en el tiana.      •Vomita.      •Se siente muy somnoliento.      •Nota alejandro línea aman en la piel que sale de la mariah afectada.      •El hueso o la articulación que se encuentra por debajo de la mariah afectada le duele después de que la piel ya se ha curado.      •La mariah afectada se oscurece.      •Tiene dificultad para respirar.        Resumen    •La dermatitis es el enrojecimiento, el dolor y la hinchazón (inflamación) de la piel. La dermatitis de contacto es alejandro reacción a ciertas sustancias que entran en contacto con la piel.      •Los síntomas de esta afección pueden presentarse en cualquier parte del cuerpo con la que usted toque la sustancia irritante o donde la sustancia irritante lo haya tocado.      •Esta afección se trata determinando la causa de la reacción y protegiendo la piel de nuevos contactos. El tratamiento también puede incluir medicamentos y cuidado de la piel.      •Evite la sustancia que ha causado la erupción. Si no sabe qué la causó, lleve un diario para tratar de identificar la causa.      •Comuníquese con un médico si mix afección empeora o si presenta signos de infección, jerica hinchazón, sensibilidad, enrojecimiento, dolor o calor en la mariah afectada.      Esta información no tiene jerica fin reemplazar el consejo del médico. Asegúrese de hacerle al médico cualquier pregunta que tenga.

## 2022-10-24 NOTE — ED PROVIDER NOTE - OBJECTIVE STATEMENT
33 yo male history of Psoriasis BIBA after being found intoxicated in the street. He is currently complaining of right groin pain. He denies pain elsewhere. History limited secondary to intoxicated state.

## 2022-10-25 VITALS
OXYGEN SATURATION: 96 % | RESPIRATION RATE: 20 BRPM | SYSTOLIC BLOOD PRESSURE: 106 MMHG | HEART RATE: 86 BPM | DIASTOLIC BLOOD PRESSURE: 67 MMHG

## 2022-10-25 PROCEDURE — 96372 THER/PROPH/DIAG INJ SC/IM: CPT

## 2022-10-25 PROCEDURE — 99285 EMERGENCY DEPT VISIT HI MDM: CPT

## 2022-10-25 PROCEDURE — 82962 GLUCOSE BLOOD TEST: CPT

## 2022-10-25 NOTE — ED ADULT NURSE REASSESSMENT NOTE - NEURO MENTATION
"Patient called the office looking to reschedule his appointment he missed yesterday. Patient states he fell down the stairs and hit his head on the railing really hard. Patient states he was really ""out of it\"" and forgot about the appointment. Encouraged patient to seek care in the ER if he feels he sustained any head or other injuries. Patient states his girlfriend and father are there with him and are watching him. Appointment rescheduled with Dr. Reilly Galan for next week. Patient verbalized understanding.    "
normal
normal

## 2022-10-25 NOTE — ED ADULT NURSE REASSESSMENT NOTE - NS ED NURSE REASSESS COMMENT FT1
Assumed care of pt at 19:15 as stated in report from MICHELLE Rosenthal. Charting as noted. Patient A&O x2, denies pain/discomfort, denies CP/SOB. Updated on the plan of care. Call bell within reach, bed locked in lowest position. IV site flushed w/ NS. No redness, swelling or pain noted to site. No signs of acute distress noted, safety maintained.
pt resting comfortably showing no signs of respiratory distress or pain, the pt is calm and cooperative
pt resting comfortably showing no signs of respiratory distress or pain, the pt is calm and cooperative

## 2023-08-15 NOTE — ED ADULT NURSE NOTE - PAIN RATING/NUMBER SCALE (0-10): REST
3
Recommend Bilateral lower lid blepharoplasty trans conj laser (discussed risks and benefits of sx. .. ).
Regular

## 2023-11-08 NOTE — ED PROVIDER NOTE - DATA REVIEWED, MDM
Referred by No ref. provider found    HPI Feeling well.  Remains physically active without difficulty.    Past Medical History:  Problem List Items Addressed This Visit    None       Past Medical History:   Diagnosis Date    CAD (coronary artery disease) 11/07/2023    Elevated calcium score 1416 Agatston units  Cath 3/2022 LAD 50-60% neg FFR, CX 85% s/p TAHIR, RCA large 80% prox, diffuse prox to mid RCA stenosis    Hypercholesterolemia 11/07/2023    Dr. Mas will follow    Other specified health status     No pertinent past surgical history    Personal history of other diseases of the musculoskeletal system and connective tissue     History of gout    Thoracic aortic aneurysm without rupture (CMS/HCC) 06/29/2023    4.1 cm on a calcium scoring CT scan             Past Surgical History:  He has a past surgical history that includes Other surgical history (11/09/2021); Other surgical history (06/20/2022); Other surgical history (03/29/2022); and Other surgical history (03/29/2022).      Social History:  He has no history on file for tobacco use, alcohol use, and drug use.    Family History:  No family history on file.     Allergies:  Patient has no known allergies.    Outpatient Medications:  Current Outpatient Medications   Medication Instructions    allopurinol (ZYLOPRIM) 300 mg, oral, Daily RT    atenolol (Tenormin) 25 mg tablet 1 tablet, oral, Daily    atorvastatin (LIPITOR) 40 mg, oral, Daily    clopidogrel (PLAVIX) 75 mg, oral, Daily    HYDROcodone-acetaminophen (Vicodin) 5-300 mg tablet 1 tablet, oral        Last Recorded Vitals:  There were no vitals filed for this visit.    Physical Exam    Physical  Patient is alert and oriented x3.  HEENT is unremarkable mucous members are moist  Neck no JVP no bruits upstrokes are full no thyromegaly  Lungs are clear bilaterally.  No wheezing crackles or rales  Heart regular rhythm normal S1-S2 there is no S3 no murmurs are heard.  Abdomen is soft vessels are positive  "nontender nondistended no organomegaly no pulsatile masses  Extremities have no edema.  Distal pulses present palpable.  Neuro is grossly nonfocal  Skin has no rashes     Last Labs:  CBC -  Lab Results   Component Value Date    WBC 6.1 05/09/2022    HGB 14.8 05/09/2022    HCT 43.8 05/09/2022    MCV 94 05/09/2022     05/09/2022       CMP -  Lab Results   Component Value Date    CALCIUM 9.4 11/02/2023    PHOS 3.8 05/09/2022    PROT 7.2 11/02/2023    ALBUMIN 4.6 11/02/2023    AST 20 11/02/2023    ALT 23 11/02/2023    ALKPHOS 68 11/02/2023    BILITOT 0.8 11/02/2023       LIPID PANEL -   Lab Results   Component Value Date    CHOL 132 11/02/2023    HDL 50.1 11/02/2023    CHHDL 2.6 11/02/2023    VLDL 22 11/02/2023    TRIG 110 11/02/2023    NHDL 82 11/02/2023       RENAL FUNCTION PANEL -   Lab Results   Component Value Date    K 4.0 11/02/2023    PHOS 3.8 05/09/2022       No results found for: \"BNP\", \"HGBA1C\"  Procedure  PCI [05/12/2022, Dr. Harmony Reid]: Successful IVUS guided PCI to RCA using two TAHIR     PCI [03/24/2022, Dr. Harmony Reid]: Non-obstructive LAD stenosis by hemodynamic evaluation. Successful IVUS guided TAHIR placement to mid LCX.      Left Heart Cath [03/24/2022, Dr. Juaquin Mas]: Severe multi-vessel disease including an 85% AV groove circumflex lesion, 75% proximal RCA followed by moderate to severe diffuse disease in the entire mid to distal portion of the RCA, and moderate disease in the LAD.     CTA [02/25/2022]: Ascending thoracic aortic aneurysmal dilatation to 4.1 cm. Sig coronary artery calcifications. LLL solid pulm nodule measuring (4 mm).      EX NST [02/25/2022]: 8 min 5 sec (10.10 METs) . . . Prob normal â€“ some degree diaphragmatic att artifact. Subtle degree ischemia involving only basilar inferoposterior wall cannot be excluded but less likely in view of normal contractility. EF 56%.     CT / SCORING [01/24/2022] = 1415.8 (LM 98.5, LAD 60, LCx 400.9, .4). " 5mm posterolateral L lung nodule, likely benign. Aneurysmal dilatation ascending thoracic aorta @ 4.6 cm.         Assessment/Plan   1. CAD. Low risk stress test inferoposterior wall ischemia. Catheterization revealed an 80% circumflex marginal branch which was successfully stented. He had a sequential 80% lesion in the RCA and PDA which was staged. He has been doing great.  Continue with aspirin daily.  No symptoms of angina.  EKG today.    2. Hyperlipidemia. Baseline . On atorvastatin 40 mg his 11/2/23 LDL 60, HDL 50 Trig 110.  He believes he can get these results back down to an LDL of less than 50.     3. Thoracic aortic aneurysm. 4.1 cm on a formal CTA. A repeat CTA chest will be done sometime in November 2023.  This has not yet been done.  Will be done and they will call 1 week later to review the results    4. Hypertension well-controlled     EKG today.  CTA chest sometime in the next couple of months.  They will call 1 week afterwards to review the results.  He will return to see me 6 months.    Juaquin Mas MD     Instructions and follow up     vital signs

## 2024-10-24 NOTE — ED PROVIDER NOTE - PROGRESS NOTE DETAILS
[facility] - [facility] Critical Care  Neurosurgery  Progress Note    Subjective:     History of Present Illness: Percy Ramirez is a 81 y.o. male with a past medical history of CABG x2 on Coumadin, HTN, T2DM, HLD who was transferred from OSH for management of large traumatic right frontal ICH. Pt initially presented to  ED 2 days ago after mechanical fall and was found to have small SDH. Repeat scan was stable and patient was discharged home with outpatient follow up and instructions to hold his blood thinners. He re-presented to  ED yesterday after suffering another unwitnessed fall and family noticing him becoming more lethargic with c/o neck and head pain. Blood glucose >600 at OSH. CTH was indicative of 5cm right frontal IPC with 7mm midline shift. K-centra was given at OSH and pt was transferred to [facility] for higher level of care. Upon arrival pt INR was 1.2. Pt has waxing and waning exam, at times oriented and following commands and at other times lethargic, disoriented, and not responding. Further history limited due to patient's mental status change.    Post-Op Info:  Procedure(s) (LRB):  CRANIOTOMY, FOR SUBDURAL HEMATOMA EVACUATION (Right)   6 Days Post-Op   Interval History: 10/24/2024: Still intubated on prop for exam. Per nursing staff, patient still withdrawing. Having R parietal/temporal seizures through yesterday, further EEG studies pending. Drains put out 30 cc and 10cc, will remove once EEG is off.    Medications:  Continuous Infusions:   insulin regular 1 units/mL infusion orderable (DKA)  0-52 Units/hr Intravenous Continuous 0.9 mL/hr at 10/24/24 0802 0.9 Units/hr at 10/24/24 0802    NORepinephrine bitartrate-D5W  0-1 mcg/kg/min Intravenous Continuous        propofol  50 mcg/kg/min Intravenous Continuous 25.1 mL/hr at 10/24/24 0832 50 mcg/kg/min at 10/24/24 0832     Scheduled Meds:   atorvastatin  40 mg Per OG tube Daily    ceFAZolin (Ancef) IV (PEDS and ADULTS)  2 g Intravenous Q8H    famotidine   20 mg Per OG tube Daily    heparin (porcine)  5,000 Units Subcutaneous Q8H    lacosamide (Vimpat) IV orderable  200 mg Intravenous Q12H    levETIRAcetam (Keppra) IV (PEDS and ADULTS)  1,000 mg Intravenous Q12H    silodosin  4 mg Per OG tube Daily     PRN Meds:  Current Facility-Administered Medications:     acetaminophen, 650 mg, Per OG tube, Q6H PRN    dextrose 10%, 12.5 g, Intravenous, PRN    dextrose 10%, 25 g, Intravenous, PRN    hydrALAZINE, 10 mg, Intravenous, Q4H PRN    labetalol, 10 mg, Intravenous, Q4H PRN    magnesium oxide, 800 mg, Per OG tube, PRN    magnesium oxide, 800 mg, Per OG tube, PRN    ondansetron, 4 mg, Intravenous, Q8H PRN    potassium bicarbonate, 35 mEq, Per OG tube, PRN    potassium bicarbonate, 50 mEq, Per OG tube, PRN    potassium bicarbonate, 60 mEq, Per OG tube, PRN    potassium, sodium phosphates, 2 packet, Per OG tube, PRN    potassium, sodium phosphates, 2 packet, Per OG tube, PRN    potassium, sodium phosphates, 2 packet, Per OG tube, PRN     Review of Systems  Objective:     Weight: 83.5 kg (184 lb)  Body mass index is 27.98 kg/m².  Vital Signs (Most Recent):  Temp: 97.4 °F (36.3 °C) (10/24/24 0702)  Pulse: 73 (10/24/24 0802)  Resp: 16 (10/24/24 0802)  BP: (!) 117/58 (10/24/24 0802)  SpO2: 100 % (10/24/24 0802) Vital Signs (24h Range):  Temp:  [96.8 °F (36 °C)-98 °F (36.7 °C)] 97.4 °F (36.3 °C)  Pulse:  [58-76] 73  Resp:  [16-22] 16  SpO2:  [97 %-100 %] 100 %  BP: ()/(50-69) 117/58  Arterial Line BP: (105-148)/(46-69) 148/69     Date 10/24/24 0700 - 10/25/24 0659   Shift 4001-7694 8704-8596 7471-5338 24 Hour Total   INTAKE   I.V.(mL/kg) 50.9(0.6)   50.9(0.6)   NG/GT 50   50   Shift Total(mL/kg) 100.9(1.2)   100.9(1.2)   OUTPUT   Shift Total(mL/kg)       Weight (kg) 83.5 83.5 83.5 83.5              Vent Mode: A/C  Oxygen Concentration (%):  [40] 40  Resp Rate Total:  [16 br/min-23 br/min] 20 br/min  Vt Set:  [500 mL] 500 mL  PEEP/CPAP:  [5 cmH20] 5 cmH20  Mean Airway  Pressure:  [7.7 cmH20-10 cmH20] 10 cmH20             Closed/Suction Drain 10/19/24 Tube - 1 Right Scalp Accordion 10 Fr. (Active)   Site Description Unable to view 10/24/24 0702   Dressing Type Gauze 10/24/24 0702   Dressing Status Clean;Dry;Intact 10/24/24 0702   Dressing Intervention Integrity maintained 10/24/24 0702   Drainage Bloody 10/24/24 0702   Status To bulb suction 10/24/24 0702   Output (mL) 30 mL 10/23/24 1702            Closed/Suction Drain 10/19/24 Tube - 2 Right Scalp Bulb 10 Fr. (Active)   Site Description Unable to view 10/24/24 0702   Dressing Type Gauze 10/24/24 0702   Dressing Status Clean;Dry;Intact 10/24/24 0702   Dressing Intervention Integrity maintained 10/24/24 0702   Drainage Serosanguineous 10/24/24 0702   Status To bulb suction 10/24/24 0702   Output (mL) 10 mL 10/23/24 1702            NG/OG Tube 10/23/24 2100 14 Fr. Center mouth (Active)   Placement Check placement verified by x-ray 10/24/24 0702   Tolerance no signs/symptoms of discomfort 10/24/24 0702   Securement secured to commercial device 10/24/24 0702   Clamp Status/Tolerance unclamped;no abdominal discomfort;no abdominal distention 10/24/24 0702   Suction Setting/Drainage Method suction at the bedside 10/24/24 0702   Insertion Site Appearance no redness, warmth, tenderness, skin breakdown, drainage 10/24/24 0702   Drainage None 10/24/24 0702   Flush/Irrigation flushed w/;water;no resistance met 10/24/24 0702   Feeding Type continuous;by pump 10/24/24 0702   Feeding Action feeding continued 10/24/24 0702   Current Rate (mL/hr) 50 mL/hr 10/24/24 0702   Goal Rate (mL/hr) 50 mL/hr 10/24/24 0702   Water Bolus (mL) 400 mL 10/24/24 0602   Formula Name Diabetic Isosource 10/24/24 0702   Intake (mL) - Formula Tube Feeding 50 10/24/24 0702            Rectal Tube 10/22/24 1718 rectal tube w/ balloon (indicate number of mLs) (Active)   Reposition drainage bags for BMS & [place] on opposite sides of bed 10/23/24 1502   Outcome gas expelled,  "stool evacuated 10/24/24 0702   Stool Color Brown 10/24/24 0702   Insertion Site Appearance no redness, warmth, tenderness, skin breakdown, drainage 10/24/24 0702   Flush/Irrigation flushed w/;water;no resistance met 10/24/24 0702   Intake (mL) 40 mL 10/23/24 2102   Rectal Tube Output 300 mL 10/23/24 0602     Neurosurgery Physical Exam     [id number]  On EEG  PERRL  +c/g/c  WD RLE>LLE  No movement in LUE    Significant Labs:  Recent Labs   Lab 10/23/24  0011 10/23/24  0812 10/23/24  1909 10/24/24  0020   *  --   --  153*   * 152* 153* 151*   K 3.8  --   --  4.3   *  --   --  120*   CO2 23  --   --  24   BUN 47*  --   --  43*   CREATININE 1.4  --   --  1.3   CALCIUM 8.0*  --   --  7.9*   MG 3.0*  --   --  3.2*     Recent Labs   Lab 10/22/24  1008 10/23/24  0011 10/24/24  0020   WBC 9.26 8.37 6.26   HGB 8.1* 8.3* 7.7*   HCT 26.5* 27.7* 25.6*   * 139* 125*     No results for input(s): "LABPT", "INR", "APTT" in the last 48 hours.  Microbiology Results (last 7 days)       Procedure Component Value Units Date/Time    Blood culture [[id number]] Collected: 10/18/24 0335    Order Status: Completed Specimen: Blood from Peripheral, Foot, Right Updated: 10/23/24 0612     Blood Culture, Routine No growth after 5 days.    Blood culture [[id number]] Collected: 10/18/24 0345    Order Status: Completed Specimen: Blood from Peripheral, Hand, Left Updated: 10/23/24 0612     Blood Culture, Routine No growth after 5 days.    Culture, Respiratory with Gram Stain [[id number]]     Order Status: No result Specimen: Respiratory           All pertinent labs from the last 24 hours have been reviewed.    Significant Diagnostics:  I have reviewed all pertinent imaging results/findings within the past 24 hours.  Assessment/Plan:     * Traumatic right-sided intracerebral hemorrhage without loss of consciousness  82 y.o. male w/ PMH of CABG x2 on Coumadin, HTN, T2DM, HLD who presents with R frontal ICH (ICH score 2) s/p fall. " Given K-centra at OSH.     Taken to OR 10/18 for R frontal IPH evac via eyebrow supraorbital craniotomy. Unfortunately, interval development of large SDH on post op scan, taken back to OR emergently for acute SDH evacuation.     Imaging:  -CTH OSH 10/17: 5cm R frontal ICH with 7mm MLS and some intraventricular blood in posterior lateral vent   -CT Csp OSH 10/17: no acute processes   -rCTH 10/18: grossly stable   -CTA 10/18: post op clot evac with new holoconvexity R SDH, 12 mm MLS   -Post op CTH 10/18: s/p SDH evacuation with appropriate evac, CTA neg   -CTH 10/21: new/increased hyperdensity in the resection cavity without worsening mass effect or midline shift   -[facility] 10/21: stable    S/p R SDH evac on 10/18    Plan:  Patient admitted to ICU on telemetry, q1h neuro checks  Hold anti-plt/coag medications. Given K-centra for reversal at OSH. INR 1.2 on arrival  MRI brain w wo ordered to look for amyloid, still pending  SBP <140   Na >135  Keppra 1g q12, vimpat and prop added - f/u cEEG, management per [facility]  LEANDRO management per N[facility]  Transfuse RBC for goal Hgb >7  No HOB restrictions  SDD to thumbprint, SG drain to full suction; abx while in place; will remove drains once EEG is taken down  WTE plan per [facility]  Continue to monitor clinically, notify NSGY immediately with any changes in neuro status    Plan discussed with Dr. Condon    Dispo: admitted to ICU            Haider Goel MD  Neurosurgery  [facility] - Neuro Critical Care   Patient received in sign-out pending sobriety. Will continue to observe. Patient is clinically sober at this time. Reports he will call his friend to come pick him up. Patient is stable for discharge. Terri Andrea MD PGY-2

## 2025-05-01 NOTE — H&P ADULT - TRAUMA TEAM ALERT/CONSULT
"UPDATE 5/1/25 1036:    SITUATION/ BACKGROUND:  Pt s/p LUE AVF revision surgery on 4/29/25 with Dr. Walton.  Pt called yesterday afternoon to report ongoing oozing from incision that was saturating her dressing.  See previous notes for details.    Called pt to follow up on her MyChart response:  Blaine Pastrana,  Yes, I saturated the bandage last night. It went through my pajamas, but not any further. I just changed the bandage, and it looked identical to the picture I sent yesterday (kind of clot \"like\"). I'm at a loss of what I should do?    ASSESSMENT:  Inquired how pt is feeling over all.  Pt denies fever/ chills.  Pt stated she feels a little worn down, but seems typical for post surgery and between dialysis treatments.  Overall patient says she feels \"fine\".    Pt reports she still feels the thrill over her AVF.  She has been checking very frequently and checked throughout the night when she would wake up.  She has been careful to sleep on her right side so she doesn't bother or compress the fistula.    Pt reports she did the ace wrapped last night, unsure if it helped last night, but mentally helps her feel better.  Pt states she has it lightly wrapped around her incision, more like an extra bandage not necessarily compression.  She can still feel the thrill of her fistula.    RECOMMENDATION:  Per discussion with Dr. Walton, instructed pt if she is feeling ok, and her arm is not terribly swollen, she can keep monitoring things and leave it alone.  If the arm becomes tight/ swollen/ painful or if the incision starts bleeding more, pt should report to the ED.  If pt is worried, she can be seen in clinic today.    Explained to pt per Dr. 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  As long as the bleeding isn't ongoing and the arm isn't swelling, the oozing is likely because she is still fresh from surgery and things are " Consult slowly healing.    Per Dr. Walton, if pt prefers, pt can do HD via CVC for 1-2 more treatments before using the fistula again.    Pt agreed with above plan to continue to monitor things and come in on Monday if the incision is still oozing.  Pt appreciated the explanation and verbalized understanding.  Pt stated they used her CVC for HD yesterday as her arm was tender from surgery the previous day.  Pt would prefer to allow things to heal a little bit more and use her CVC for the next 2 HD treatments.    PLAN:  Pt to continue to monitor her incision, and change the dressing as needed.  Monitor for AVF thrill multiple times throughout the day, especially if arm is wrapped in ace bandage.  Pt to call/ report to ED if:  Arm becomes tight/ swollen  Increased bleeding  Unable to get the bleeding to stop  Arm becomes painful  Pt to use CVC for HD access the next 2 treatments (Friday and Monday).  Pt to call with updates or concerns.    Will continue to follow pt.  Pt scheduled for follow up on Monday, 5/5/25.    ANNABEL SPRING RN on 5/1/2025 at 12:21 PM  Dialysis Access Surgery Care Coordinator  Phone: [phone]  Pool: [email or web address]

## 2025-06-21 NOTE — ED ADULT NURSE NOTE - PSH
PAST MEDICAL HISTORY:  Benign prostatic hyperplasia with lower urinary tract symptoms, symptom details unspecified     CAD (coronary artery disease) w/ 4 stents    Chronic obstructive pulmonary disease, unspecified COPD type     Chronic obstructive pulmonary disease, unspecified COPD type     COPD, mild     Difficulty walking     DM (diabetes mellitus)     DM2 (diabetes mellitus, type 2)     GI bleed 2020    History of blood transfusion Transfusion of Plasma    HLD (hyperlipidemia)     HTN (hypertension)     Lymphedema     Multiple myeloma     Obesity, morbid, BMI 40.0-49.9     SANTO on CPAP     Stented coronary artery     
No significant past surgical history